# Patient Record
Sex: MALE | Race: WHITE | Employment: PART TIME | URBAN - METROPOLITAN AREA
[De-identification: names, ages, dates, MRNs, and addresses within clinical notes are randomized per-mention and may not be internally consistent; named-entity substitution may affect disease eponyms.]

---

## 2018-05-23 PROBLEM — E78.2 MIXED HYPERLIPIDEMIA: Status: ACTIVE | Noted: 2018-05-23

## 2018-05-23 PROBLEM — M25.562 CHRONIC PAIN OF LEFT KNEE: Status: ACTIVE | Noted: 2018-05-23

## 2018-05-23 PROBLEM — G89.29 CHRONIC PAIN OF LEFT KNEE: Status: ACTIVE | Noted: 2018-05-23

## 2018-05-23 PROBLEM — L71.9 ROSACEA: Status: ACTIVE | Noted: 2018-05-23

## 2018-09-30 PROBLEM — K21.9 GERD WITHOUT ESOPHAGITIS: Status: ACTIVE | Noted: 2018-09-30

## 2018-10-07 PROBLEM — E80.4 GILBERT DISEASE: Status: ACTIVE | Noted: 2018-10-07

## 2018-10-07 PROBLEM — Z78.9 HISTORY OF EXCESSIVE CERUMEN: Status: ACTIVE | Noted: 2018-10-07

## 2019-02-27 PROCEDURE — 84402 ASSAY OF FREE TESTOSTERONE: CPT | Performed by: INTERNAL MEDICINE

## 2019-02-27 PROCEDURE — 84403 ASSAY OF TOTAL TESTOSTERONE: CPT | Performed by: INTERNAL MEDICINE

## 2019-04-17 PROCEDURE — 88305 TISSUE EXAM BY PATHOLOGIST: CPT | Performed by: INTERNAL MEDICINE

## 2019-08-06 PROBLEM — F41.1 GAD (GENERALIZED ANXIETY DISORDER): Status: ACTIVE | Noted: 2019-08-06

## 2021-02-24 PROBLEM — I77.810 DILATATION OF THORACIC AORTA (HCC): Status: ACTIVE | Noted: 2021-02-24

## 2023-10-10 ENCOUNTER — LAB REQUISITION (OUTPATIENT)
Age: 63
End: 2023-10-10
Payer: COMMERCIAL

## 2023-10-10 DIAGNOSIS — K21.9 GERD (GASTROESOPHAGEAL REFLUX DISEASE): ICD-10-CM

## 2023-10-10 DIAGNOSIS — R47.02 DYSPHASIA: ICD-10-CM

## 2023-10-10 DIAGNOSIS — R19.4 CHANGE IN BOWEL HABITS: ICD-10-CM

## 2023-10-10 PROCEDURE — 88305 TISSUE EXAM BY PATHOLOGIST: CPT | Performed by: INTERNAL MEDICINE

## 2024-10-16 ENCOUNTER — APPOINTMENT (OUTPATIENT)
Dept: ULTRASOUND IMAGING | Facility: HOSPITAL | Age: 64
End: 2024-10-16
Attending: EMERGENCY MEDICINE
Payer: COMMERCIAL

## 2024-10-16 ENCOUNTER — HOSPITAL ENCOUNTER (EMERGENCY)
Facility: HOSPITAL | Age: 64
Discharge: HOME | End: 2024-10-16
Attending: EMERGENCY MEDICINE
Payer: COMMERCIAL

## 2024-10-16 VITALS
HEIGHT: 75 IN | HEART RATE: 70 BPM | WEIGHT: 186 LBS | TEMPERATURE: 98 F | DIASTOLIC BLOOD PRESSURE: 72 MMHG | RESPIRATION RATE: 16 BRPM | SYSTOLIC BLOOD PRESSURE: 119 MMHG | OXYGEN SATURATION: 99 % | BODY MASS INDEX: 23.13 KG/M2

## 2024-10-16 DIAGNOSIS — M79.661 RIGHT CALF PAIN: Primary | ICD-10-CM

## 2024-10-16 PROCEDURE — 93971 EXTREMITY STUDY: CPT | Performed by: EMERGENCY MEDICINE

## 2024-10-16 PROCEDURE — 99284 EMERGENCY DEPT VISIT MOD MDM: CPT

## 2024-10-16 RX ORDER — ASPIRIN 81 MG/1
81 TABLET ORAL DAILY
COMMUNITY

## 2024-10-16 RX ORDER — CELECOXIB 200 MG/1
200 CAPSULE ORAL 2 TIMES DAILY
COMMUNITY

## 2024-10-16 RX ORDER — OXYCODONE HYDROCHLORIDE 5 MG/1
5 CAPSULE ORAL
COMMUNITY

## 2024-10-16 RX ORDER — ONDANSETRON HYDROCHLORIDE 4 MG/5ML
4 SOLUTION ORAL 2 TIMES DAILY PRN
COMMUNITY

## 2024-10-16 NOTE — ED INITIAL ASSESSMENT (HPI)
PT states that he had a right total knee replacement nine days ago, PT states that he has been feeling right calf pain since the surgery but it has been getting worse in the last five days despite taking prescribed oxycodone and tylenol as scheduled. Directed to come to the ED for DVT rule out by surgeon

## 2024-10-16 NOTE — ED PROVIDER NOTES
Patient Seen in: [facility] Emergency Department      History     Chief Complaint   Patient presents with    Deep Vein Thrombosis     Stated Complaint: calf pain, r/o DVT    Subjective:   HPI      64-year-old with a history of anxiety, GERD, high cholesterol presents with his wife for evaluation of right calf pain.  Some of the history is provided by his wife.  Patient had a total knee replacement on 10/7.  Patient has had pain in his R calf for the last 6 days. Not getting better with pain meds or PT manipulation.  It has been swollen however he notes that the swelling has been improving. No fever. No weakness or numbness. No chest pain, shortness of breath, hemoptysis.  Was told to take 81 mg aspirin twice a day after the surgery.  Thinks his mother had a DVT after knee surgery  Records reviewed.  Patient underwent right total knee arthroplasty with Dr. Hampton at [facility] on 10/7.  Discharge instructions note that patient is to take 81 mg aspirin twice a day for 4 weeks.    Objective:     Past Medical History:    Chronic pain of left knee    Dilatation of thoracic aorta (HCC)    4.3 cm on ct chest    FORREST (generalized anxiety disorder)    GERD without esophagitis    Gilbert disease    History of excessive cerumen    Mixed hyperlipidemia    Rosacea    Tinnitus of right ear              Past Surgical History:   Procedure Laterality Date    Cholecystectomy      open    Colonoscopy  2009    Colonoscopy N/A 04/17/2019    Procedure: COLONOSCOPY, POSSIBLE BIOPSY, POSSIBLE POLYPECTOMY 26731;  Surgeon: Alfredo Denton MD;  Location: [facility]    Inguinal hernia repair      bilateral    Knee surgery      Other      testicle roaming    Total knee replacement Right                 Social History     Socioeconomic History    Marital status:      Spouse name: Rito    Number of children: 3    Years of education: college   Occupational History    Occupation: /owner of [employer]   Tobacco Use     Smoking status: Never    Smokeless tobacco: Never   Substance and Sexual Activity    Alcohol use: Yes     Comment: Socially    Drug use: No    Sexual activity: Yes     Partners: Female   Social History Narrative    Lives in [facility]         since 1983                      Physical Exam     ED Triage Vitals [10/16/24 1027]   /75   Pulse 75   Resp 16   Temp 98.1 °F (36.7 °C)   Temp src Oral   SpO2 98 %   O2 Device None (Room air)       Current Vitals:   Vital Signs  BP: 139/75  Pulse: 66  Resp: 14  Temp: 98.1 °F (36.7 °C)  Temp src: Oral    Oxygen Therapy  SpO2: 96 %  O2 Device: None (Room air)        Physical Exam  General: Patient is awake, alert in no acute distress.   HEENT:  Sclera are not icteric.  Conjunctivae within normal limits.  Mucous members are moist.   Cardiovascular: Regular rate   Respiratory:  No cough or conversational dyspnea  Extremities: Right knee incision clean dry intact without erythema or purulence.  Mild swelling to the right calf versus the left.  Mild tenderness to the calf.  Vascular: 2+ DP PT pulses right lower extremity with brisk capillary refill    ED Course   Labs Reviewed - No data to display  Right lower extremity venous Doppler: I personally reviewed the radiographs and my individual interpretation shows no superficial femoral vein clot.  I also reviewed the official report which showed no DVT.              MDM      History is obtained from: Wife    Previous records reviewed as noted in HPI    Differential includes, but is not limited to, DVT, postop changes, arterial occlusion     Review of any radiographic studies: Ultrasound negative    Shared decision making with the patient.  Workup reassuring.  Recommend outpatient follow-up with orthopedics as planned.  Return for increased swelling, shortness of breath or hemoptysis.               Medical Decision Making      Disposition and Plan     Clinical Impression:  1. Right calf pain          Disposition:  Discharge  10/16/2024 12:24 pm    Follow-up:  Sung Hampton MD  [address] [id number] [phone]    Follow up  as planned          Medications Prescribed:  Current Discharge Medication List              Supplementary Documentation: